# Patient Record
(demographics unavailable — no encounter records)

---

## 2024-12-17 NOTE — CARDIOLOGY SUMMARY
[de-identified] : February 8, 2022.  Patient exercised for 6 minutes to a heart rate of 160 and a blood pressure of 152/66.  No real symptoms.  No significant ST changes.  No VPCs.  No echocardiographic evidence of ischemia. [de-identified] : February 8, 2022.  Normal mitral valve with minimal MR.  Normal trileaflet aortic valve with no AI.  Normal left atrium.  Normal LV size and function with LVEF 60%.  Stage I diastolic dysfunction.  Normal RV size and function with moderate TR and RVSP 37.  Normal pericardium with no effusion.

## 2024-12-17 NOTE — DISCUSSION/SUMMARY
[FreeTextEntry1] : Routine labs sent as mentioned.  Reassure her about her blood pressure but let her monitor it at home for a few weeks and then call me.  So far no change in medication. [EKG obtained to assist in diagnosis and management of assessed problem(s)] : EKG obtained to assist in diagnosis and management of assessed problem(s)

## 2024-12-17 NOTE — REVIEW OF SYSTEMS
[Palpitations] : palpitations [Depression] : depression [Anxiety] : anxiety [Under Stress] : under stress [Negative] : Heme/Lymph [Chest Discomfort] : no chest discomfort [Dizziness] : no dizziness [Suicidal] : not suicidal [de-identified] : see HPI.  Had an episode of vertigo. [de-identified] : see HPI

## 2024-12-17 NOTE — HISTORY OF PRESENT ILLNESS
[FreeTextEntry1] : November 10, 2021.  Patient is 65 years old and has been healthy all her life.  Only medical history is laparoscopic surgery in her 30s for endometriosis and tonsils when she was 13 years old.  She has been hypertensive mildly for the last 3 years and hyperlipidemia on medication for about 8 to 10 years but no diabetes no cigarette smoking.  Her father  of an MI at 59, he had his first MI at 35 and then had quadruple bypass and then was being investigated as a possible candidate for transplant.  He was a smoker.  Paternal uncles and his paternal grand parents all  less than 60 years old or in their 60s.  One paternal aunt has hypertension and hyperlipidemia.  1 brother who  of drugs was hypertensive and hyperlipidemic and another brother who is alive has "uncontrollable" blood pressure.  On her mother side her mother  of endometrial cancer the age of 78 but one of her mothers uncles also had coronary artery disease.  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  Occasionally she may feel a little dizzy and "not right; she has had vertigo in the past as well but thinks this is different although she does have a sinus history and sees ENT.  She has been a little more concerned and may be a little more symptomatic since her partner  a year and a half ago.  Her only medications are lisinopril 5 mg and Zocor 20 mg which she really takes only every third day although she supposed to take it every other day.  She thinks she had a stress test at the age of 50 but never had an echo. 2022. Patient returns for stress echocardiogram.  Exercised for 6 minutes to a heart rate of 160 and a blood pressure of 152/66.  Did not seem to be symptomatic at that time although heart rate was fairly high.  No ST changes.  No VPCs.  No echo evidence of ischemia.  Overall echo with just minimal MR, normal aortic valve, normal left atrium, normal LV size and function with LVEF 60%.  Mild diastolic dysfunction.  Normal RV size and function with moderate TR and RVSP 37.  Patient active does a lot of yoga so surprising that heart rate got so high at low level of exercise but patient looked comfortable and perhaps does not do that much aerobic exercise.  Patient reassured.  2022.  First visit since above.  Patient had been evaluated at [facility] emergency room on .  Labs were unremarkable including a NT proBNP of 322 and a troponin I of 8 with normal being less than 51.  Follow-up with 7.  Chest x-ray was normal.  No notes or EKGs sent.  Today's EKG is sinus rhythm at 63 and otherwise within normal limits. Being treated for anxiety and depression by "psycho-" who put her on Effexor. After one dose felt funny, ? panic attack symptoms, etc. BP at home that afternoon was 190 systolic. Friend told her to go to ER--BP high, came down after Ativan. Finally sent home. No CP, etc. Saw primary care . BP okay, given propranolol 20 mg bid for "anxiety and BP". 2023.  First visit since last .  EKG here is sinus rhythm at 62 and still a normal tracing.  Initial blood pressure elevated to 160/89.  She has been under stress and just not feeling great but no exertional symptoms, no palpitations per se although sometimes her heart feels stronger and no syncope or near syncope.  Did have 1 episode of vertigo that seems to have resolved and she has had that before.  Blood pressure mildly elevated on follow-up as well but clearly coming down closer to baseline.  No interval hospitals or emergency room's or urgent care etc. and no change in her medications; just on lisinopril and simvastatin. (2024. Seen by Dr. Granados while I was out. "Patient of Dr. Bautista here for urgent visit. Reports generally not feeling well in last 2 weeks that she describes as fatigue. Reports multiple complaints includign unilateral right sided pain that is attributed to cervical disease. Reports elevated BPs more recently at other doctor's appointment to sBP of 140s. Also reports dizziness when changing positions that is most noticeable when she is dehydrated. She states that she has been trying to exercise more to see if this will help and has noticed no changes in symptoms with increased exercise. She is very active at baseline as a . Reports recent life stressors of having to pack for an upcoming music festival she is attending. Was previously on low dose antidepressants for stress which she said had helped but she self-discontinued as she did not want to be on medications. Denies palpitations, chest pain, dyspnea, orthopnea, PND, ankle swelling, syncope. We discussed that there is unlikely a cardiac cause to her non-specific fatigue. EKG today showed nsr without ischemic changes. We discussed BP control at length and I recommended she address her stress which she plans to see her prior physicians for consideration of medication. She was concerned with the accuracy of her BP cuff and I provided her with the validatebp.org website to look for other devices or she may bring her device into the office to crosscheck the readings with ours. She will keep a 2 week log of BP at home and we discussed correct measurement technique. I also discussed that it would be worthwhile to review the logs once her temporary life stressors are resolved next month/she returns from the music festival.") 2024.  Patient returns here in follow-up.  A little more anxious again, this time because her recent relationship broke up.  She is now on Prozac increasing from 25-30 and that her doctor also gave her prescription for Klonopin for panic attacks but she has not used that.  She brought her blood pressure machine with her and in fact it is fairly accurate so we can continue to go by her numbers.  Her latest numbers at home are in the 1 50-1 60 range so she may need more medication but she was taking them early in the day when she first gets up.  We agreed to have her monitor at home maybe 3-4 times a week when it is very calm and relaxed and then she will call me in 2 weeks.  Otherwise no symptoms and no medical issues.

## 2024-12-17 NOTE — ASSESSMENT
[FreeTextEntry1] : Healthy active 65-year-old woman with mild hypertension and hyperlipidemia and markedly abnormal family history for coronary artery disease.  Dealing with stress and some depression and possibly a reaction to Effexor but more likely just a little panic attack.  Blood pressure high so was urged to go to the emergency room.  I reassured the patient and explained in great detail how high blood pressure without malignant hypertension is not an emergency and not a reason to run to the emergency room.  She seems back to her baseline and I told her that I would not prescribe propranolol or anxiolytics but if her other doctors wanted to use short acting propranolol for panic attacks I have no objection.  I reiterated I would not use it as a as needed based on blood pressure numbers only.  Long discussion about risk factors, modification, significance of family history etc.   instructed to call if she was having continuous chest pa and needed help decide if that would require an emergency room visit. Patient returned June 2023 and has been doing extremely well just feeling a little bit stressed lately and wanted to be sure everything was okay.  Sometimes the heartbeat is a little bit stronger but otherwise no symptoms, certainly no exertional pressure or tightness or shortness of breath.  No significant medical issues in the interim, still just on lisinopril and simvastatin, and both her EKG and her exam are totally within normal limits.  Blood pressure little higher than usual but that probably goes along with the stress that she is having.  I reassured her about all of this, sent off labs.   She returns today December 17, 2024.  She was also seen in July by Dr. Granados as mentioned above.  She is more anxious once again because her relationship broke up.  Otherwise from a medical and cardiac point of view she is actually doing extremely well with an unremarkable exam and a normal ECG with sinus at 65, no LVH etc.  Her home machine is accurate and her blood pressures have been running high but I instructed her not to take it first thing in the morning and if anything take it when she is relaxed and distracted in the middle or end of the day.  She will do this 3-4 times a week for 2 weeks and then call me with the results.  If they are still running high I would increase her lisinopril to 10 mg.  Meanwhile routine labs were sent including lipids hemoglobin A1c etc.  Thyroid function as well.  She is currently on Prozac.

## 2024-12-17 NOTE — REASON FOR VISIT
[FreeTextEntry1] : 68-year-old woman with a very positive family history of coronary artery disease advised to have a cardiac checkup.  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